# Patient Record
Sex: MALE | Race: WHITE | NOT HISPANIC OR LATINO | Employment: OTHER | URBAN - METROPOLITAN AREA
[De-identification: names, ages, dates, MRNs, and addresses within clinical notes are randomized per-mention and may not be internally consistent; named-entity substitution may affect disease eponyms.]

---

## 2024-04-02 ENCOUNTER — APPOINTMENT (OUTPATIENT)
Dept: GENERAL RADIOLOGY | Facility: HOSPITAL | Age: 79
End: 2024-04-02
Payer: MEDICARE

## 2024-04-02 ENCOUNTER — HOSPITAL ENCOUNTER (EMERGENCY)
Facility: HOSPITAL | Age: 79
Discharge: LEFT BEFORE DISCHARGE | End: 2024-04-02
Attending: EMERGENCY MEDICINE | Admitting: EMERGENCY MEDICINE
Payer: MEDICARE

## 2024-04-02 VITALS
SYSTOLIC BLOOD PRESSURE: 117 MMHG | HEIGHT: 72 IN | TEMPERATURE: 97.8 F | WEIGHT: 140 LBS | RESPIRATION RATE: 17 BRPM | DIASTOLIC BLOOD PRESSURE: 50 MMHG | HEART RATE: 77 BPM | OXYGEN SATURATION: 92 % | BODY MASS INDEX: 18.96 KG/M2

## 2024-04-02 DIAGNOSIS — R07.9 CHEST PAIN, UNSPECIFIED TYPE: Primary | ICD-10-CM

## 2024-04-02 LAB
ANION GAP SERPL CALCULATED.3IONS-SCNC: 11 MMOL/L (ref 5–15)
BASOPHILS # BLD AUTO: 0.03 10*3/MM3 (ref 0–0.2)
BASOPHILS NFR BLD AUTO: 0.5 % (ref 0–1.5)
BUN SERPL-MCNC: 25 MG/DL (ref 8–23)
BUN/CREAT SERPL: 27.2 (ref 7–25)
CALCIUM SPEC-SCNC: 10.4 MG/DL (ref 8.6–10.5)
CHLORIDE SERPL-SCNC: 98 MMOL/L (ref 98–107)
CO2 SERPL-SCNC: 28 MMOL/L (ref 22–29)
CREAT SERPL-MCNC: 0.92 MG/DL (ref 0.76–1.27)
DEPRECATED RDW RBC AUTO: 49.5 FL (ref 37–54)
EGFRCR SERPLBLD CKD-EPI 2021: 84.1 ML/MIN/1.73
EOSINOPHIL # BLD AUTO: 0.03 10*3/MM3 (ref 0–0.4)
EOSINOPHIL NFR BLD AUTO: 0.5 % (ref 0.3–6.2)
ERYTHROCYTE [DISTWIDTH] IN BLOOD BY AUTOMATED COUNT: 14 % (ref 12.3–15.4)
GEN 5 2HR TROPONIN T REFLEX: 40 NG/L
GLUCOSE SERPL-MCNC: 220 MG/DL (ref 65–99)
HCT VFR BLD AUTO: 38.6 % (ref 37.5–51)
HGB BLD-MCNC: 12.7 G/DL (ref 13–17.7)
IMM GRANULOCYTES # BLD AUTO: 0.01 10*3/MM3 (ref 0–0.05)
IMM GRANULOCYTES NFR BLD AUTO: 0.2 % (ref 0–0.5)
LYMPHOCYTES # BLD AUTO: 1.44 10*3/MM3 (ref 0.7–3.1)
LYMPHOCYTES NFR BLD AUTO: 23.4 % (ref 19.6–45.3)
MCH RBC QN AUTO: 31.7 PG (ref 26.6–33)
MCHC RBC AUTO-ENTMCNC: 32.9 G/DL (ref 31.5–35.7)
MCV RBC AUTO: 96.3 FL (ref 79–97)
MONOCYTES # BLD AUTO: 0.32 10*3/MM3 (ref 0.1–0.9)
MONOCYTES NFR BLD AUTO: 5.2 % (ref 5–12)
NEUTROPHILS NFR BLD AUTO: 4.32 10*3/MM3 (ref 1.7–7)
NEUTROPHILS NFR BLD AUTO: 70.2 % (ref 42.7–76)
NRBC BLD AUTO-RTO: 0 /100 WBC (ref 0–0.2)
PLATELET # BLD AUTO: 188 10*3/MM3 (ref 140–450)
PMV BLD AUTO: 10.6 FL (ref 6–12)
POTASSIUM SERPL-SCNC: 4.5 MMOL/L (ref 3.5–5.2)
RBC # BLD AUTO: 4.01 10*6/MM3 (ref 4.14–5.8)
SODIUM SERPL-SCNC: 137 MMOL/L (ref 136–145)
TROPONIN T DELTA: -3 NG/L
TROPONIN T SERPL HS-MCNC: 43 NG/L
WBC NRBC COR # BLD AUTO: 6.15 10*3/MM3 (ref 3.4–10.8)

## 2024-04-02 PROCEDURE — 36415 COLL VENOUS BLD VENIPUNCTURE: CPT

## 2024-04-02 PROCEDURE — 93005 ELECTROCARDIOGRAM TRACING: CPT | Performed by: EMERGENCY MEDICINE

## 2024-04-02 PROCEDURE — 80048 BASIC METABOLIC PNL TOTAL CA: CPT | Performed by: EMERGENCY MEDICINE

## 2024-04-02 PROCEDURE — 84484 ASSAY OF TROPONIN QUANT: CPT | Performed by: EMERGENCY MEDICINE

## 2024-04-02 PROCEDURE — 71045 X-RAY EXAM CHEST 1 VIEW: CPT

## 2024-04-02 PROCEDURE — 99284 EMERGENCY DEPT VISIT MOD MDM: CPT

## 2024-04-02 PROCEDURE — 85025 COMPLETE CBC W/AUTO DIFF WBC: CPT | Performed by: EMERGENCY MEDICINE

## 2024-04-02 RX ORDER — SODIUM CHLORIDE 0.9 % (FLUSH) 0.9 %
10 SYRINGE (ML) INJECTION AS NEEDED
Status: DISCONTINUED | OUTPATIENT
Start: 2024-04-02 | End: 2024-04-02 | Stop reason: HOSPADM

## 2024-04-02 NOTE — ED PROVIDER NOTES
Subjective   History of Present Illness  Patient is an 78-year-old male complaining of chest pain that he had earlier this morning.  He has no pain at this time.  Nuys cough fever shortness of breath or other associated complaints.      Review of Systems    No past medical history on file.    No Known Allergies    No past surgical history on file.    No family history on file.    Social History     Socioeconomic History    Marital status: Single           Objective   Physical Exam  Neck has no adenopathy JVD or bruits.  Lungs are clear.  Heart has a regular rate rhythm without murmur rub or gallop.  Chest is nontender.  Abdomen soft.  Extremity exam unremarkable.  Procedures     My EKG interpretation is normal sinus rhythm at a rate of 72 with no acute ST change      ED Course      Results for orders placed or performed during the hospital encounter of 04/02/24   Basic Metabolic Panel    Specimen: Blood   Result Value Ref Range    Glucose 220 (H) 65 - 99 mg/dL    BUN 25 (H) 8 - 23 mg/dL    Creatinine 0.92 0.76 - 1.27 mg/dL    Sodium 137 136 - 145 mmol/L    Potassium 4.5 3.5 - 5.2 mmol/L    Chloride 98 98 - 107 mmol/L    CO2 28.0 22.0 - 29.0 mmol/L    Calcium 10.4 8.6 - 10.5 mg/dL    BUN/Creatinine Ratio 27.2 (H) 7.0 - 25.0    Anion Gap 11.0 5.0 - 15.0 mmol/L    eGFR 84.1 >60.0 mL/min/1.73   High Sensitivity Troponin T    Specimen: Blood   Result Value Ref Range    HS Troponin T 43 (H) <22 ng/L   CBC Auto Differential    Specimen: Blood   Result Value Ref Range    WBC 6.15 3.40 - 10.80 10*3/mm3    RBC 4.01 (L) 4.14 - 5.80 10*6/mm3    Hemoglobin 12.7 (L) 13.0 - 17.7 g/dL    Hematocrit 38.6 37.5 - 51.0 %    MCV 96.3 79.0 - 97.0 fL    MCH 31.7 26.6 - 33.0 pg    MCHC 32.9 31.5 - 35.7 g/dL    RDW 14.0 12.3 - 15.4 %    RDW-SD 49.5 37.0 - 54.0 fl    MPV 10.6 6.0 - 12.0 fL    Platelets 188 140 - 450 10*3/mm3    Neutrophil % 70.2 42.7 - 76.0 %    Lymphocyte % 23.4 19.6 - 45.3 %    Monocyte % 5.2 5.0 - 12.0 %    Eosinophil %  0.5 0.3 - 6.2 %    Basophil % 0.5 0.0 - 1.5 %    Immature Grans % 0.2 0.0 - 0.5 %    Neutrophils, Absolute 4.32 1.70 - 7.00 10*3/mm3    Lymphocytes, Absolute 1.44 0.70 - 3.10 10*3/mm3    Monocytes, Absolute 0.32 0.10 - 0.90 10*3/mm3    Eosinophils, Absolute 0.03 0.00 - 0.40 10*3/mm3    Basophils, Absolute 0.03 0.00 - 0.20 10*3/mm3    Immature Grans, Absolute 0.01 0.00 - 0.05 10*3/mm3    nRBC 0.0 0.0 - 0.2 /100 WBC   High Sensitivity Troponin T 2Hr    Specimen: Blood   Result Value Ref Range    HS Troponin T 40 (H) <22 ng/L    Troponin T Delta -3 >=-4 - <+4 ng/L   ECG 12 Lead Chest Pain   Result Value Ref Range    QT Interval 430 ms    QTC Interval 473 ms     XR Chest 1 View    Result Date: 4/2/2024  Impression: No acute process. Electronically Signed: Lexis Gordon MD  4/2/2024 10:39 AM EDT  Workstation ID: [id number]                                          Medical Decision Making  My chest x-ray interpretation shows no cardiomegaly fusion or infiltrate.  Patient has an elevated troponin of 43 on his first troponin.  BMP shows mild renal insufficiency with no electrolyte abnormality glucose is 220.  CBC shows no leukocytosis no left shift and no anemia.  Patient refused to stay for his repeat troponin.  We discussed with the patient several times the importance of the repeat troponin however he left and refused to sign AMA papers as well.  He states he understood also the risks involved.    Amount and/or Complexity of Data Reviewed  Labs: ordered. Decision-making details documented in ED Course.  Radiology: ordered and independent interpretation performed.  ECG/medicine tests: ordered and independent interpretation performed.    Risk  Prescription drug management.        Final diagnoses:   Chest pain, unspecified type       ED Disposition  ED Disposition       ED Disposition   AMA    Condition   --    Comment   --               No follow-up provider specified.       Medication List      No changes were made to your  prescriptions during this visit.            Elio Malhotra MD  04/02/24 [id number]

## 2024-04-02 NOTE — ED NOTES
"Patients son came to nurses station demanding that he discharge patient. Son states that patient is ready to go home. Nurse informed son that we do not have second troponin back yet and we should have it back shortly. Son states that patient is extremely mad and wants to leave. ER nurse attempted to discuss with ER provider but provider with another patient. Primary nurse addressed patient concerns. Patient started yelling at nurse stating she was \"wasting his time\" and wanted all the stuff off of him. Patient yelling that we are messing up his medication schedule. Patient demanded nurse to remove IV. When nurse removed IV and was wrapping up with coban patient started jerking his arm around yelling \" get this shit off of me!!\" Nurse educated that it was to keep him from bleeding post IV removal. Nurse educated on AMA form and ask patient to sign but he refused. Patient started throwing stuff on the floor and was removing gown. Nurse removed herself from the room. CN notified.   "

## 2024-04-02 NOTE — ED NOTES
Patient verbalized frustration with ER staff and the amount of time he has spent here. He states that he has to take several medications at noon and he is needing to leave.Nurse educated patient on estimated result time, repeat troponin results, and plan of care discussion. Patient requesting water at this time. Patient provided water per ER provider approval.

## 2024-04-03 LAB
QT INTERVAL: 430 MS
QTC INTERVAL: 473 MS

## 2024-04-09 ENCOUNTER — APPOINTMENT (OUTPATIENT)
Dept: CT IMAGING | Facility: HOSPITAL | Age: 79
End: 2024-04-09
Payer: MEDICARE

## 2024-04-09 ENCOUNTER — HOSPITAL ENCOUNTER (EMERGENCY)
Facility: HOSPITAL | Age: 79
Discharge: LEFT BEFORE DISCHARGE | End: 2024-04-09
Attending: EMERGENCY MEDICINE | Admitting: EMERGENCY MEDICINE
Payer: MEDICARE

## 2024-04-09 ENCOUNTER — APPOINTMENT (OUTPATIENT)
Dept: GENERAL RADIOLOGY | Facility: HOSPITAL | Age: 79
End: 2024-04-09
Payer: MEDICARE

## 2024-04-09 VITALS
DIASTOLIC BLOOD PRESSURE: 88 MMHG | WEIGHT: 139 LBS | RESPIRATION RATE: 15 BRPM | BODY MASS INDEX: 18.83 KG/M2 | TEMPERATURE: 98 F | HEART RATE: 65 BPM | OXYGEN SATURATION: 95 % | HEIGHT: 72 IN | SYSTOLIC BLOOD PRESSURE: 173 MMHG

## 2024-04-09 DIAGNOSIS — I21.4 NSTEMI (NON-ST ELEVATED MYOCARDIAL INFARCTION): Primary | ICD-10-CM

## 2024-04-09 DIAGNOSIS — R06.02 CHRONIC SHORTNESS OF BREATH: ICD-10-CM

## 2024-04-09 DIAGNOSIS — K52.9 GASTROENTERITIS: ICD-10-CM

## 2024-04-09 DIAGNOSIS — M54.9 OTHER ACUTE BACK PAIN: ICD-10-CM

## 2024-04-09 PROBLEM — R79.89 ELEVATED TROPONIN: Status: ACTIVE | Noted: 2024-04-09

## 2024-04-09 LAB
ALBUMIN SERPL-MCNC: 4.8 G/DL (ref 3.5–5.2)
ALBUMIN/GLOB SERPL: 2.2 G/DL
ALP SERPL-CCNC: 74 U/L (ref 39–117)
ALT SERPL W P-5'-P-CCNC: 19 U/L (ref 1–41)
ANION GAP SERPL CALCULATED.3IONS-SCNC: 10.5 MMOL/L (ref 5–15)
AST SERPL-CCNC: 19 U/L (ref 1–40)
BASOPHILS # BLD AUTO: 0.02 10*3/MM3 (ref 0–0.2)
BASOPHILS NFR BLD AUTO: 0.3 % (ref 0–1.5)
BILIRUB SERPL-MCNC: 0.3 MG/DL (ref 0–1.2)
BUN SERPL-MCNC: 44 MG/DL (ref 8–23)
BUN/CREAT SERPL: 36.4 (ref 7–25)
CALCIUM SPEC-SCNC: 9.7 MG/DL (ref 8.6–10.5)
CHLORIDE SERPL-SCNC: 100 MMOL/L (ref 98–107)
CO2 SERPL-SCNC: 25.5 MMOL/L (ref 22–29)
CREAT SERPL-MCNC: 1.21 MG/DL (ref 0.76–1.27)
DEPRECATED RDW RBC AUTO: 50 FL (ref 37–54)
EGFRCR SERPLBLD CKD-EPI 2021: 60.5 ML/MIN/1.73
EOSINOPHIL # BLD AUTO: 0.07 10*3/MM3 (ref 0–0.4)
EOSINOPHIL NFR BLD AUTO: 0.9 % (ref 0.3–6.2)
ERYTHROCYTE [DISTWIDTH] IN BLOOD BY AUTOMATED COUNT: 13.9 % (ref 12.3–15.4)
FLUAV SUBTYP SPEC NAA+PROBE: NOT DETECTED
FLUBV RNA ISLT QL NAA+PROBE: NOT DETECTED
GEN 5 2HR TROPONIN T REFLEX: 53 NG/L
GLOBULIN UR ELPH-MCNC: 2.2 GM/DL
GLUCOSE SERPL-MCNC: 220 MG/DL (ref 65–99)
HCT VFR BLD AUTO: 39.8 % (ref 37.5–51)
HGB BLD-MCNC: 12.8 G/DL (ref 13–17.7)
IMM GRANULOCYTES # BLD AUTO: 0.01 10*3/MM3 (ref 0–0.05)
IMM GRANULOCYTES NFR BLD AUTO: 0.1 % (ref 0–0.5)
LYMPHOCYTES # BLD AUTO: 1.45 10*3/MM3 (ref 0.7–3.1)
LYMPHOCYTES NFR BLD AUTO: 19.4 % (ref 19.6–45.3)
MCH RBC QN AUTO: 31.4 PG (ref 26.6–33)
MCHC RBC AUTO-ENTMCNC: 32.2 G/DL (ref 31.5–35.7)
MCV RBC AUTO: 97.5 FL (ref 79–97)
MONOCYTES # BLD AUTO: 0.36 10*3/MM3 (ref 0.1–0.9)
MONOCYTES NFR BLD AUTO: 4.8 % (ref 5–12)
NEUTROPHILS NFR BLD AUTO: 5.55 10*3/MM3 (ref 1.7–7)
NEUTROPHILS NFR BLD AUTO: 74.5 % (ref 42.7–76)
NT-PROBNP SERPL-MCNC: 132.4 PG/ML (ref 0–1800)
PLATELET # BLD AUTO: 230 10*3/MM3 (ref 140–450)
PMV BLD AUTO: 10.5 FL (ref 6–12)
POTASSIUM SERPL-SCNC: 4.3 MMOL/L (ref 3.5–5.2)
PROT SERPL-MCNC: 7 G/DL (ref 6–8.5)
QT INTERVAL: 422 MS
QTC INTERVAL: 454 MS
RBC # BLD AUTO: 4.08 10*6/MM3 (ref 4.14–5.8)
SARS-COV-2 RNA RESP QL NAA+PROBE: NOT DETECTED
SODIUM SERPL-SCNC: 136 MMOL/L (ref 136–145)
TROPONIN T DELTA: -17 NG/L
TROPONIN T SERPL HS-MCNC: 70 NG/L
WBC NRBC COR # BLD AUTO: 7.46 10*3/MM3 (ref 3.4–10.8)

## 2024-04-09 PROCEDURE — 25010000002 FENTANYL CITRATE (PF) 50 MCG/ML SOLUTION: Performed by: NURSE PRACTITIONER

## 2024-04-09 PROCEDURE — 80053 COMPREHEN METABOLIC PANEL: CPT | Performed by: NURSE PRACTITIONER

## 2024-04-09 PROCEDURE — 84484 ASSAY OF TROPONIN QUANT: CPT | Performed by: NURSE PRACTITIONER

## 2024-04-09 PROCEDURE — 25010000002 MORPHINE PER 10 MG: Performed by: NURSE PRACTITIONER

## 2024-04-09 PROCEDURE — 99284 EMERGENCY DEPT VISIT MOD MDM: CPT

## 2024-04-09 PROCEDURE — 72131 CT LUMBAR SPINE W/O DYE: CPT

## 2024-04-09 PROCEDURE — 87636 SARSCOV2 & INF A&B AMP PRB: CPT | Performed by: EMERGENCY MEDICINE

## 2024-04-09 PROCEDURE — 85025 COMPLETE CBC W/AUTO DIFF WBC: CPT | Performed by: NURSE PRACTITIONER

## 2024-04-09 PROCEDURE — 93005 ELECTROCARDIOGRAM TRACING: CPT | Performed by: NURSE PRACTITIONER

## 2024-04-09 PROCEDURE — 71045 X-RAY EXAM CHEST 1 VIEW: CPT

## 2024-04-09 PROCEDURE — 96374 THER/PROPH/DIAG INJ IV PUSH: CPT

## 2024-04-09 PROCEDURE — 72128 CT CHEST SPINE W/O DYE: CPT

## 2024-04-09 PROCEDURE — 83880 ASSAY OF NATRIURETIC PEPTIDE: CPT | Performed by: NURSE PRACTITIONER

## 2024-04-09 PROCEDURE — 25010000002 ONDANSETRON PER 1 MG: Performed by: NURSE PRACTITIONER

## 2024-04-09 PROCEDURE — 36415 COLL VENOUS BLD VENIPUNCTURE: CPT

## 2024-04-09 PROCEDURE — 96375 TX/PRO/DX INJ NEW DRUG ADDON: CPT

## 2024-04-09 RX ORDER — ONDANSETRON 2 MG/ML
4 INJECTION INTRAMUSCULAR; INTRAVENOUS ONCE
Status: COMPLETED | OUTPATIENT
Start: 2024-04-09 | End: 2024-04-09

## 2024-04-09 RX ORDER — FENTANYL CITRATE 50 UG/ML
25 INJECTION, SOLUTION INTRAMUSCULAR; INTRAVENOUS ONCE
Status: DISCONTINUED | OUTPATIENT
Start: 2024-04-09 | End: 2024-04-10 | Stop reason: HOSPADM

## 2024-04-09 RX ORDER — MORPHINE SULFATE 2 MG/ML
2 INJECTION, SOLUTION INTRAMUSCULAR; INTRAVENOUS ONCE
Status: COMPLETED | OUTPATIENT
Start: 2024-04-09 | End: 2024-04-09

## 2024-04-09 RX ORDER — FENTANYL CITRATE 50 UG/ML
50 INJECTION, SOLUTION INTRAMUSCULAR; INTRAVENOUS ONCE
Status: COMPLETED | OUTPATIENT
Start: 2024-04-09 | End: 2024-04-09

## 2024-04-09 RX ORDER — SODIUM CHLORIDE 0.9 % (FLUSH) 0.9 %
10 SYRINGE (ML) INJECTION AS NEEDED
Status: DISCONTINUED | OUTPATIENT
Start: 2024-04-09 | End: 2024-04-10 | Stop reason: HOSPADM

## 2024-04-09 RX ORDER — HYDROCODONE BITARTRATE AND ACETAMINOPHEN 7.5; 325 MG/1; MG/1
1 TABLET ORAL ONCE
Status: COMPLETED | OUTPATIENT
Start: 2024-04-09 | End: 2024-04-09

## 2024-04-09 RX ADMIN — HYDROCODONE BITARTRATE AND ACETAMINOPHEN 1 TABLET: 7.5; 325 TABLET ORAL at 17:49

## 2024-04-09 RX ADMIN — MORPHINE SULFATE 2 MG: 2 INJECTION, SOLUTION INTRAMUSCULAR; INTRAVENOUS at 15:29

## 2024-04-09 RX ADMIN — ONDANSETRON 4 MG: 2 INJECTION INTRAMUSCULAR; INTRAVENOUS at 15:28

## 2024-04-09 RX ADMIN — FENTANYL CITRATE 50 MCG: 50 INJECTION, SOLUTION INTRAMUSCULAR; INTRAVENOUS at 15:55

## 2024-04-09 NOTE — Clinical Note
Level of Care: Telemetry [5]   Diagnosis: Elevated troponin [526043]   Admitting Physician: GUNNAR GRIFFIN [[id number]]

## 2024-04-09 NOTE — FSED PROVIDER NOTE
EMERGENCY DEPARTMENT ENCOUNTER    Room Number:  06/06  Date seen:  4/9/2024  Time seen: 15:07 EDT  PCP: Misty Marroquin MD  Historian: Patient    Discussed/obtained information from independent historians: Not applicable    HPI:  Chief complaint: Fall, back pain  A complete HPI/ROS/PMH/PSH/SH/FH are unobtainable due to: Not applicable  Context:Uriel Castellanos is a 78 y.o. male who presents to the ED with c/o of a fall that occurred 1 week ago.  He reports lower back pain that is severe in nature that is made better by standing.  He reports he has had nausea, vomiting, diarrhea that started about 3 days ago.  He did not mention fever, or chills.  Patient did not report taking any medications for his symptoms.  He has a history of COPD.  He wears home oxygen 3 L continuously.  He reports he is always short of air.  He denies chest pain.  He was seen 1 week ago at [facility] for chest pain.  He has a previous medical history of COPD, DM, dementia, hypertension, chronic shortness of air and chronic pain.    External (non-ED) record review: Records reviewed from [facility], and [facility].    Chronic or social conditions impacting care: n/a    ALLERGIES  Patient has no known allergies.    PAST MEDICAL HISTORY  Active Ambulatory Problems     Diagnosis Date Noted    No Active Ambulatory Problems     Resolved Ambulatory Problems     Diagnosis Date Noted    No Resolved Ambulatory Problems     Past Medical History:   Diagnosis Date    BPH (benign prostatic hyperplasia)     CAD (coronary artery disease)     COPD (chronic obstructive pulmonary disease)     Dementia     Diabetes     Hypertension        PAST SURGICAL HISTORY  No past surgical history on file.    FAMILY HISTORY  No family history on file.    SOCIAL HISTORY  Social History     Socioeconomic History    Marital status: Single       REVIEW OF SYSTEMS  Review of Systems    All systems reviewed and negative except for  those discussed in HPI.     PHYSICAL EXAM    I have reviewed the triage vital signs and nursing notes.  Vitals:    04/09/24 2104   BP: 173/88   Pulse: 65   Resp: 15   Temp:    SpO2: 95%     Physical Exam  Constitutional:       Comments: Elderly, chronically ill appearing   HENT:      Head: Normocephalic.   Cardiovascular:      Rate and Rhythm: Normal rate and regular rhythm.   Pulmonary:      Breath sounds: Examination of the left-upper field reveals wheezing. Examination of the right-lower field reveals rales.      Comments: Mild increased work of breathing that may be normal for patient.  Musculoskeletal:      Comments: Patient has severe mid T-spine and lower lumbar tenderness and appears very uncomfortable.   Pt restless and ambulating frequently in room.    Neurological:      Mental Status: He is alert.      Comments: Patient with significant tremor that he states is chronic     HEARTSCORE    History  Highly suspicious              2    Moderately suspicious             1    Slightly or non-suspicious             0    ECG  Significant ST depression              2    Nonspecific repol disturbance            1    Normal                           0    Age  > or = 65                          2     46-65                           1    < or = 45                          0    Risk factors (hypercholesterolemia, HTN, DM, smoking, pos fam hx, obesity)                            > or = to 3 RF for atherosclerotic dx   2    1 or 2                 1    No risk factors                0    Troponin > or = 3x normal limit               2    1-3x normal limit    1    < or = Normal limit    0    Score  0 - 3 is low risk (0.9-1.7% risk of adverse cardiac event)  Score  4 - 6 is moderate risk (12-16.6% risk of adverse cardiac event)  Score  6 - 9 is high risk (50-65% risk of adverse cardiac event)    This patient's HEART score is 6, EKG reassuring.  Pt's 2nd trop significantly improved from prior.      LAB RESULTS  Recent Results  (from the past 24 hour(s))   COVID-19 and FLU A/B PCR, 1 HR TAT - Swab, Nasopharynx    Collection Time: 04/09/24  3:05 PM    Specimen: Nasopharynx; Swab   Result Value Ref Range    COVID19 Not Detected Not Detected - Ref. Range    Influenza A PCR Not Detected Not Detected    Influenza B PCR Not Detected Not Detected   Comprehensive Metabolic Panel    Collection Time: 04/09/24  3:26 PM    Specimen: Blood   Result Value Ref Range    Glucose 220 (H) 65 - 99 mg/dL    BUN 44 (H) 8 - 23 mg/dL    Creatinine 1.21 0.76 - 1.27 mg/dL    Sodium 136 136 - 145 mmol/L    Potassium 4.3 3.5 - 5.2 mmol/L    Chloride 100 98 - 107 mmol/L    CO2 25.5 22.0 - 29.0 mmol/L    Calcium 9.7 8.6 - 10.5 mg/dL    Total Protein 7.0 6.0 - 8.5 g/dL    Albumin 4.8 3.5 - 5.2 g/dL    ALT (SGPT) 19 1 - 41 U/L    AST (SGOT) 19 1 - 40 U/L    Alkaline Phosphatase 74 39 - 117 U/L    Total Bilirubin 0.3 0.0 - 1.2 mg/dL    Globulin 2.2 gm/dL    A/G Ratio 2.2 g/dL    BUN/Creatinine Ratio 36.4 (H) 7.0 - 25.0    Anion Gap 10.5 5.0 - 15.0 mmol/L    eGFR 60.5 >60.0 mL/min/1.73   BNP    Collection Time: 04/09/24  3:26 PM    Specimen: Blood   Result Value Ref Range    proBNP 132.4 0.0 - 1,800.0 pg/mL   High Sensitivity Troponin T    Collection Time: 04/09/24  3:26 PM    Specimen: Blood   Result Value Ref Range    HS Troponin T 70 (C) <22 ng/L   CBC Auto Differential    Collection Time: 04/09/24  3:26 PM    Specimen: Blood   Result Value Ref Range    WBC 7.46 3.40 - 10.80 10*3/mm3    RBC 4.08 (L) 4.14 - 5.80 10*6/mm3    Hemoglobin 12.8 (L) 13.0 - 17.7 g/dL    Hematocrit 39.8 37.5 - 51.0 %    MCV 97.5 (H) 79.0 - 97.0 fL    MCH 31.4 26.6 - 33.0 pg    MCHC 32.2 31.5 - 35.7 g/dL    RDW 13.9 12.3 - 15.4 %    RDW-SD 50.0 37.0 - 54.0 fl    MPV 10.5 6.0 - 12.0 fL    Platelets 230 140 - 450 10*3/mm3    Neutrophil % 74.5 42.7 - 76.0 %    Lymphocyte % 19.4 (L) 19.6 - 45.3 %    Monocyte % 4.8 (L) 5.0 - 12.0 %    Eosinophil % 0.9 0.3 - 6.2 %    Basophil % 0.3 0.0 - 1.5 %     Immature Grans % 0.1 0.0 - 0.5 %    Neutrophils, Absolute 5.55 1.70 - 7.00 10*3/mm3    Lymphocytes, Absolute 1.45 0.70 - 3.10 10*3/mm3    Monocytes, Absolute 0.36 0.10 - 0.90 10*3/mm3    Eosinophils, Absolute 0.07 0.00 - 0.40 10*3/mm3    Basophils, Absolute 0.02 0.00 - 0.20 10*3/mm3    Immature Grans, Absolute 0.01 0.00 - 0.05 10*3/mm3   ECG 12 Lead Dyspnea    Collection Time: 04/09/24  3:27 PM   Result Value Ref Range    QT Interval 422 ms    QTC Interval 454 ms   High Sensitivity Troponin T 2Hr    Collection Time: 04/09/24  5:19 PM    Specimen: Blood   Result Value Ref Range    HS Troponin T 53 (C) <22 ng/L    Troponin T Delta -17 (L) >=-4 - <+4 ng/L       Ordered the above labs and independently interpreted results.  My findings will be discussed in the ED course or medical decision making section below    RADIOLOGY RESULTS  CT Lumbar Spine Without Contrast    Result Date: 4/9/2024  CT LUMBAR SPINE WO CONTRAST, CT THORACIC SPINE WO CONTRAST Date of Exam: 4/9/2024 4:01 PM EDT Indication: Exquisite lower back pain after fall patient unable to tolerate plain film. Comparison: None available. Technique: Axial CT images were obtained of the thoracic and lumbar spine without contrast administration.  Sagittal and coronal reconstructions were performed.  Automated exposure control and iterative reconstruction methods were used. Findings: CT THORACIC SPINE OSSEOUS STRUCTURES: No fracture nor bony destructive process. ALIGNMENT:  Normal DISC SPACE: There is mild to moderate mid to lower thoracic spondylosis. JOINTS: No significant arthropathy. SOFT TISSUES:  Unremarkable LEVELS: No significant osseous central canal nor foraminal stenosis identified at any level. CT LUMBAR SPINE OSSEOUS STRUCTURES: There our 6 nonrib-bearing lumbar-type vertebral bodies. There is chronic L6 spondylolysis with grade 1 spondylolisthesis at L6/S1. There appears to be a chronic superior endplate compression fracture of L1 reducing into  vertebral body height by 20-30%. Other vertebral body heights are well-maintained. No acute fracture is identified on CT imaging. If symptoms persist, MRI might be useful. ALIGNMENT: Grade 1 spondylolisthesis at L6/S1 DISC SPACE: Moderate spondylosis most pronounced at the lumbosacral junction. JOINTS: No significant arthropathy. SOFT TISSUES:  Unremarkable LEVELS: No significant osseous central canal nor foraminal stenosis identified at any level.     Impression: 1.There is an L1 compression fracture which appears to be chronic. No definite acute process identified. 2.There are 6 nonrib-bearing lumbar-type vertebral bodies. There is chronic bilateral L6 spondylolysis with grade 1 spondylolisthesis at L6/S1. 3.Mild to moderate multifactorial degenerative changes otherwise. Electronically Signed: Eliud Lan MD  4/9/2024 4:20 PM EDT  Workstation ID: [id number]    CT Thoracic Spine Without Contrast    Result Date: 4/9/2024  CT LUMBAR SPINE WO CONTRAST, CT THORACIC SPINE WO CONTRAST Date of Exam: 4/9/2024 4:01 PM EDT Indication: Exquisite lower back pain after fall patient unable to tolerate plain film. Comparison: None available. Technique: Axial CT images were obtained of the thoracic and lumbar spine without contrast administration.  Sagittal and coronal reconstructions were performed.  Automated exposure control and iterative reconstruction methods were used. Findings: CT THORACIC SPINE OSSEOUS STRUCTURES: No fracture nor bony destructive process. ALIGNMENT:  Normal DISC SPACE: There is mild to moderate mid to lower thoracic spondylosis. JOINTS: No significant arthropathy. SOFT TISSUES:  Unremarkable LEVELS: No significant osseous central canal nor foraminal stenosis identified at any level. CT LUMBAR SPINE OSSEOUS STRUCTURES: There our 6 nonrib-bearing lumbar-type vertebral bodies. There is chronic L6 spondylolysis with grade 1 spondylolisthesis at L6/S1. There appears to be a chronic superior endplate compression  fracture of L1 reducing into vertebral body height by 20-30%. Other vertebral body heights are well-maintained. No acute fracture is identified on CT imaging. If symptoms persist, MRI might be useful. ALIGNMENT: Grade 1 spondylolisthesis at L6/S1 DISC SPACE: Moderate spondylosis most pronounced at the lumbosacral junction. JOINTS: No significant arthropathy. SOFT TISSUES:  Unremarkable LEVELS: No significant osseous central canal nor foraminal stenosis identified at any level.     Impression: 1.There is an L1 compression fracture which appears to be chronic. No definite acute process identified. 2.There are 6 nonrib-bearing lumbar-type vertebral bodies. There is chronic bilateral L6 spondylolysis with grade 1 spondylolisthesis at L6/S1. 3.Mild to moderate multifactorial degenerative changes otherwise. Electronically Signed: Eliud Lan MD  4/9/2024 4:20 PM EDT  Workstation ID: [id number]    XR Chest 1 View    Result Date: 4/9/2024  XR CHEST 1 VW Date of Exam: 4/9/2024 3:53 PM EDT Indication: soa Comparison: 4/2/2024 Findings: Cardiomediastinal silhouette is unremarkable.  No airspace disease, pneumothorax, nor pleural effusion. No acute osseous abnormality identified.     Impression: No acute process identified Electronically Signed: Eliud Lan MD  4/9/2024 4:12 PM EDT  Workstation ID: [id number]      Ordered the above noted radiological studies.  Independently interpreted by me.  My findings will be discussed in the medical decision section below.     PROGRESS, DATA ANALYSIS, CONSULTS AND MEDICAL DECISION MAKING    Please note that this section constitutes my independent interpretation of clinical data including lab results, radiology, EKG's.  This constitutes my independent professional opinion regarding differential diagnosis and management of this patient.  It may include any factors such as history from outside sources, review of external records, social determinants of health, management of medications,  "response to those treatments, and discussions with other providers.    ED Course as of 04/09/24 2218 Tue Apr 09, 2024   1551 Patient reports pain has not improved at all after dose of morphine.  Additional pain medication ordered [EW]   1555 EKG          EKG time: 1527  Rhythm/Rate: 70, sinus rhythm, POOR TRACING  P waves and OR: normal OR, normal RAMAN  QRS, axis:  Normal QRS, RBBB unchanged  ST and T waves: no acute ST/T wave abnormalities    Interpreted Contemporaneously by me, independently viewed     [EW]   1630 Pt states his back pain is \"almost like it never even happened\".  He is no longer pacing the floors and is seated.  [EW]   1716 Discussed admission with Dr. Brito, on call for [facility] medicine.  Discussed presentation, imaging, elevated trop.  He agrees to admit.  [EW]   2142 Pt becoming very cantankerous.  He is adamant he is going to leave.  His family has left.  EMS is en route.  I offered additional pain medication and he is now refusing this.  Checked his demographic form to call family and there are no family members listed.  Staff member sitting with patient.  [EW]   2207 Pt seems to be calming down.  He does have h/o dementia and I am concerned he is having some Sundowning.  He is being much more cooperative after speaking with his family on the phone.  [EW]   2217 Pt's son is here.  Pt desires to leave AGAINST MEDICAL ADVICE.  I have discussed the risk of heart attack, death etc.  The son verbalizes understanding and these risk of taking his father home.  Patient states he will follow-up with his cardiologist first thing in the morning in [place].  Of note, this is the second time patient is left the hospital AGAINST MEDICAL ADVICE in 1 week. [EW]      ED Course User Index  [EW] Nidia Peñaloza APRN     Orders placed during this visit:  Orders Placed This Encounter   Procedures    COVID-19 and FLU A/B PCR, 1 HR TAT - Swab, Nasopharynx    XR Chest 1 View    CT Lumbar Spine " Without Contrast    CT Thoracic Spine Without Contrast    Comprehensive Metabolic Panel    BNP    High Sensitivity Troponin T    CBC Auto Differential    Urinalysis without microscopic (no culture) - Urine, Clean Catch    High Sensitivity Troponin T 2Hr    Continuous Pulse Oximetry    ECG 12 Lead Dyspnea    Blood Draw With IV Start    Insert peripheral IV    Initiate Observation Status    CBC & Differential    ED Acknowledgement Form Needed;            Medical Decision Making  Problems Addressed:  Chronic shortness of breath: complicated acute illness or injury  Gastroenteritis: complicated acute illness or injury  NSTEMI (non-ST elevated myocardial infarction): complicated acute illness or injury  Other acute back pain: complicated acute illness or injury    Amount and/or Complexity of Data Reviewed  Labs: ordered.  Radiology: ordered.  ECG/medicine tests: ordered.    Risk  Prescription drug management.  Decision regarding hospitalization.    DDX considered: NSTEMI, influenza, Covid, pneumonia, t/l spine injury, compression fracture.     MDM:  CT lumbar and thoracic spine images negative for acute fractures.  He does have chronic L1 Compression fracture noted.  Patient presents with acute back pain. Considered for ACS, EKG was non STEMI, however troponin was elevated concerning for NSTEMI.  Spinal pain was controlled with fentanyl and then hydrocodone.  Patient with no signs of heart failure. Given history and story considered but low risk for aortic dissection, pneumonia, or PE.    Patient was seen by a healthcare provider and left AMA. Pt was counseled on risks of DEATH, HEART ATTACK  and verbalized understanding of risks. His family member also verbalized understanding of this risk.         DIAGNOSIS  Final diagnoses:   Other acute back pain   Gastroenteritis   Chronic shortness of breath   NSTEMI (non-ST elevated myocardial infarction)          Medication List      No changes were made to your prescriptions  during this visit.         Left AGAINST MEDICAL ADVICE    Latest Documented Vital Signs:  As of 22:18 EDT  BP- 173/88 HR- 65 Temp- 98 °F (36.7 °C) (Oral) O2 sat- 95%    Appropriate PPE utilized throughout this patient encounter to include mask, if indicated, per current protocol. Hand hygiene was performed before donning PPE and after removal when leaving the room.    Please note that portions of this were completed with a voice recognition program.     Note Disclaimer: At [facility], we believe that sharing information builds trust and better relationships. You are receiving this note because you are receiving care at [facility] or recently visited. It is possible you will see health information before a provider has talked with you about it. This kind of information can be easy to misunderstand. To help you fully understand what it means for your health, we urge you to discuss this note with your provider.

## 2024-04-09 NOTE — ED NOTES
Pt presents to ER from for fall 3 days ago in kitchen; landing on tile on back. Pt denies LOC. Pt here for images of his back. Pt reports chronic shortness of breath, wearks 3l nasal cannula baseline and reports chronic tremors. Pt denies chest pain, dizziness.

## 2024-04-09 NOTE — ED NOTES
Unable to keep monitor on pt; pt up moving in room and back and forth from chair to bed to wheelchair due to being uncomfortable.

## 2024-04-10 NOTE — ED NOTES
"Pt calling out asking for another norco for pain. This RN informed patient that he had just been given one a couple hours ago. Pt yelling and stating \"I am in so much pain and I am going home\"     Pt continues to be angry and states that he is not going to stay and is going to \"walk home\" CLIFTON Jacob at bedside speaking with patient and informed of risks of leaving AMA but pt will have to have a ride home dt patient condition at this time. Pt calling family members with no response.   "

## 2024-04-10 NOTE — ED NOTES
Pts son-in-law at bedside and pt states that he is leaving with him at this time. Risk of leaving AMA covered with pt and son-in-law. Pt states that he is unable to sign papers due to poor use of his hands and pt told son-in-law to sign papers for him. IV removed and pt was taken to car in [place] for pts safety.